# Patient Record
Sex: MALE
[De-identification: names, ages, dates, MRNs, and addresses within clinical notes are randomized per-mention and may not be internally consistent; named-entity substitution may affect disease eponyms.]

---

## 2022-06-14 PROBLEM — Z00.00 ENCOUNTER FOR PREVENTIVE HEALTH EXAMINATION: Status: ACTIVE | Noted: 2022-06-14

## 2022-06-20 ENCOUNTER — APPOINTMENT (OUTPATIENT)
Dept: UROLOGY | Facility: CLINIC | Age: 82
End: 2022-06-20
Payer: OTHER GOVERNMENT

## 2022-06-20 VITALS
HEIGHT: 66 IN | BODY MASS INDEX: 28.2 KG/M2 | DIASTOLIC BLOOD PRESSURE: 70 MMHG | HEART RATE: 68 BPM | SYSTOLIC BLOOD PRESSURE: 162 MMHG | WEIGHT: 175.44 LBS

## 2022-06-20 PROCEDURE — 99072 ADDL SUPL MATRL&STAF TM PHE: CPT

## 2022-06-20 PROCEDURE — 99204 OFFICE O/P NEW MOD 45 MIN: CPT

## 2022-06-20 NOTE — HISTORY OF PRESENT ILLNESS
[FreeTextEntry1] : Language: English/Tanzanian\par Date of First visit: 6/20/2022\par Accompanied by: wife Alyssa\par Contact info: [phone]\par Referring Provider/PCP: Uzma Khan\par [address] [place] [address], [address]\par [place] [address] \par Phone [phone]\par Fax: [phone]\par \par \par CC/ Problem List:\par \par ===============================================================================\par FIRST VISIT:\par The patient is a 81 year male who first presents 06/20/2022 for "low (free) PSA". \par He had prostate cancer s/p RT in 2013. He is urinating well. His pre-treatment PSA was around 4.7\par He is eating but because his protein in his urine is high, he is told that he can't eat meat or soy.\par he denies abnormal weight loss, hematuria. He feels well. \par \par He also had kidney cancer and one kidney (right) was removed in 2004.\par He also had a prostate ?TURP before his prostate RT.\par \par He is on finasteride. He is also on tamsulosin. \par \par -------------------------------------------------------------------------------------------\par INTERVAL VISITS:\par \par \par ===============================================================================\par \par PMH: Prostate Cancer, CKD, HTN, HLD, Kidney cancer\par PSH: Nayely, eye surgery, nephrectomy in 2004 for kidney cancer, ?TURP before prostate RT\par POBH: (if applicable)\par FH: \par \par ALL: NKDA\par MEDS: Pt does not remember except Hydralazine, Tamsulosin, Finasteride, Cholesterol, Sodium Bicarb, Vit D, allopurinol, ? nifedipine\par SOC:\par \par \par ROS: Review of Systems is as per HPI unless otherwise denoted below\par \par \par ===============================================================================\par DATA: \par \par LABS:-------------------------------------------------------------------------------------------------------------------\par 4/7/2022: PSA 0.04, T 82.2 (free T)\par 4/13/2022: alb:creat 208 high, UA 1+ protein, Neg NIT/LE, RBC, sCre 1.29\par \par \par \par RADS:-------------------------------------------------------------------------------------------------------------------\par \par \par \par PATHOLOGY/CYTOLOGY:-------------------------------------------------------------------------------------------\par \par \par \par VOIDING STUDIES: ----------------------------------------------------------------------------------------------------\par 6/20/2022: PVR about 30-40mins after voiding 105cc\par \par \par STONE STUDIES: (Analysis/LLSA)----------------------------------------------------------------------------------\par \par \par \par PROCEDURES: -----------------------------------------------------------------------------------------------\par \par \par \par \par ===============================================================================\par \par PHYSICAL EXAM:\par \par GEN: AAOx3, NAD; Habitus: normal\par \par BARRIERS to CARE: medical history\par \par PSYCH: Appropriate Behavior, Affect Congruent\par \par HEENT: AT/NC Trachea midline. EOMI.\par \par Lungs: No labored breathing.\par \par NEURO: + Movement, all 4 extremities grossly intact without deficits. No tremors.\par \par SKIN: Warm dry. No visible rashes or ulcers\par \par GAIT: Gait antalgic, Stability fair\par \par \par =======================================================================================\par \par \par \par \par ASSESSMENT and PLAN\par \par The patient is a 81 year male with a history of the following:\par \par 1. Prostate cancer s/p RT in 2013 with low total PSA. we do not check free PSA after RT for prostate cancer and therefore his result is not valid. His total PSA is perfect.\par \par 2. Kidney cancer s/p nephrectomy in 2004- He does not need further surveillance.\par NCCN Guidelines for Surveillance After Treatment of Renal Cell Carcinoma are as follows: (Version 4.2022)\par \par Stage T1a for Active Surveillance\par 	H&P and Lab tests annually or as indicated\par 	ABD CT or MRI with contrast within 6 mos of surveillance then CT, MRI or sono at least annually\par 	Chest imaging at baseline and annually as clinically indicated\par 	Consider biopsy at initiation of active surveillance or as indicated\par \par Stage I (pT1a, pT1b)\par 	H&P and labs annually or as indicated\par 	ABD CT or MRI at baseline or sono within 3-12 mos of surgery then annually x3 years \par                    or longer if indicated\par 	Positive margins or high risk features* can trigger more frequent imaging\par 	CHEST imaging (x-ray or CT) annually for five years then as indicated. \par                 More rigorous if adverse path features/+ padmini\par \par Stage II or III or s/p Adjuvant Therapy\par 	H&P Q3-6 mos for 3 years then annually up to 5 years then as indicated\par 	CMP and other tests as indicated Q3-6 mos for 3 years, then annually up to \par                   5 years then as indicated\par                 ABD CT or MRI within 3-6 mos, then CT or MRI (preferred) Q3-6mo x 3 years then \par                   annually to 5 years then as indicated\par                 Chest CT within 3-6 mos with CT (preferred) Q3-6 mos x 3 years then annually \par                   up to 5 years, then as indicated\par                 Additional imaging as warranted\par \par Follow up for Relapsed or Stage IV and Surgically Unresectable Disease\par 	H&P Q6-16weeks if receiving systemic therapy or more frequently\par 	Labs per protocol\par 	CT or MRI at baseline and f/u Q6-16 weeks\par 	MRI (preferred) or CT of head at baseline; Annual surveillance if indicated\par 	MRI spine and/or bone scan if indicated\par \par *Examples of high risk features include high nuclear grade, sarcomatoid features\par \par 3. BPH: He is on tamsulosin and finasteride. We will continue that. His PVR is reasonable\par \par -----------------------------------------------------------------------------------------------------\par LABS/TESTS Ordered:\par Meds Ordered:\par Follow up: one year w/ Dr. Moncada\par -----------------------------------------------------------------------------------------------------\par \par Greater than 50% of this 45 minute visit was spent counseling the patient and coordinating care.\par \par Thank you for allowing me to assist in the care of your patient. Should you have any questions please do not hesitate to reach out to me.\par \par \par Maryann Sequeira MD\par Associate \[facility] Department of Urology\par [facility] Phone: [phone]\par Fax: [phone]\[facility] \par [address]\[place] [address]\[facility]

## 2023-05-18 ENCOUNTER — APPOINTMENT (OUTPATIENT)
Dept: UROLOGY | Facility: CLINIC | Age: 83
End: 2023-05-18

## 2023-05-25 ENCOUNTER — APPOINTMENT (OUTPATIENT)
Dept: UROLOGY | Facility: CLINIC | Age: 83
End: 2023-05-25
Payer: COMMERCIAL

## 2023-05-25 VITALS
OXYGEN SATURATION: 97 % | HEART RATE: 51 BPM | BODY MASS INDEX: 28.77 KG/M2 | SYSTOLIC BLOOD PRESSURE: 120 MMHG | DIASTOLIC BLOOD PRESSURE: 70 MMHG | WEIGHT: 178.99 LBS | HEIGHT: 66 IN

## 2023-05-25 DIAGNOSIS — C61 MALIGNANT NEOPLASM OF PROSTATE: ICD-10-CM

## 2023-05-25 PROCEDURE — 99213 OFFICE O/P EST LOW 20 MIN: CPT

## 2023-05-25 NOTE — HISTORY OF PRESENT ILLNESS
[FreeTextEntry1] : Language: Ugandan\par Accompanied by: Wife (Alyssa)\par Contact info:  [phone]\par Referring Provider/PCP:  Johann Cobb, F: [phone]\par \par Initial H&P 06/20/2022 (Initial Visit with Hua): \par The patient is a 81 year male who first presents 06/20/2022 for "low (free) PSA". \par He had prostate cancer s/p RT in 2013. He is urinating well. His pre-treatment PSA was around 4.7\par He is eating but because his protein in his urine is high, he is told that he can't eat meat or soy.\par he denies abnormal weight loss, hematuria. He feels well. \par \par He also had kidney cancer and one kidney (right) was removed in 2004.\par He also had a prostate ?TURP before his prostate RT.\par \par He is on finasteride. He is also on tamsulosin. \par ----------------------------------------------------------------------------------------------------------\par Interval History 05/25/2023:\joshua Presents today for f/u.  Last seen in clinic 6/20/22. \par \par Plan at last visit was to cont flomax and finasteride and annual PSA checks.\par \par Since his last visit, he has had no new symptoms. Had UA done by nephrologist: showed "many bacteria" but was otherwise completely neg including 0 WBCs and nit neg.  Was told to see urology for it.  \par \par Also had CT A/P in the interim for chronic left flank pain - no hydro, NSF in left kidney, (+) bladder wall thickening.\par \par WRT his chronic left flank pain, states that it is exacerbated after doing pushups. \par ---------------------------------------------------------------------------------------------------------------------------------------\par PMH: Prostate Cancer, CKD, HTN, HLD, Kidney cancer\par PSH: Nayely, eye surgery, nephrectomy in 2004 for kidney cancer, ?TURP before prostate RT\par ALL: NKDA\par \par ROS: Review of Systems is as per HPI unless otherwise denoted below\par ---------------------------------------------------------------------------------------------------------------------------------------\par Physical Exam:\par General: NAD, sitting on exam table comfortably\par HEENT: NCAT, EOMI\par Resp: breathing comfortably on RA, b/l symm chest rise\par Cardiac: RRR\par Abd: SNTND, scars:\par Back: (-) CVAT b/l, point tenderness at left flank, just inferior to 12th rib\par MSK: JUAN cain/ FROM\par Psych: appropriate affect\par \par ---------------------------------------------------------------------------------------------------------------------------------------\par Results:\par PSAs\par 04/07/2022: 0.04\par \par PVRs\par 6/20/22: 40\par ---------------------------------------------------------------------------------------------------------------------------------------\par A/P: 82M with history of prostate ca s/p XRT 2013, kidney ca, and hx of LUTS managed well on flomax and finasteride. \par \par 1. Prostate Ca\par Cont annual psa checks. Does not have recent one in the labs he brought with him.  Will check in office today. \par \par 2. LUTS\par cont flomax/finasteride\par \par 3. Kidney Ca\par No further surveillance indicated. Non-con was negative for any concerning findings of recurrence or new masses in left kidney.\par \par 4. Left flank pain\par Nonurologic. Explained to patient that between the history of exacerbation with physical activity and point tenderness elicited on physical exam, his flank pain is likely MSK in nature. He will discuss with Dr. Cobb. \par \par Plan:\par - PSA today\par - RT 1y\par

## 2023-05-26 LAB — PSA SERPL-MCNC: 0.22 NG/ML

## 2023-06-19 ENCOUNTER — APPOINTMENT (OUTPATIENT)
Dept: UROLOGY | Facility: CLINIC | Age: 83
End: 2023-06-19